# Patient Record
Sex: FEMALE | Race: WHITE | NOT HISPANIC OR LATINO | Employment: OTHER | URBAN - METROPOLITAN AREA
[De-identification: names, ages, dates, MRNs, and addresses within clinical notes are randomized per-mention and may not be internally consistent; named-entity substitution may affect disease eponyms.]

---

## 2018-12-06 ENCOUNTER — HOSPITAL ENCOUNTER (EMERGENCY)
Facility: HOSPITAL | Age: 78
Discharge: HOME | End: 2018-12-06
Attending: EMERGENCY MEDICINE | Admitting: EMERGENCY MEDICINE

## 2018-12-06 ENCOUNTER — APPOINTMENT (OUTPATIENT)
Dept: CT IMAGING | Facility: HOSPITAL | Age: 78
End: 2018-12-06

## 2018-12-06 VITALS
WEIGHT: 130 LBS | HEART RATE: 105 BPM | DIASTOLIC BLOOD PRESSURE: 88 MMHG | TEMPERATURE: 98.1 F | HEIGHT: 65 IN | SYSTOLIC BLOOD PRESSURE: 121 MMHG | OXYGEN SATURATION: 96 % | BODY MASS INDEX: 21.66 KG/M2 | RESPIRATION RATE: 24 BRPM

## 2018-12-06 DIAGNOSIS — J98.01 BRONCHOSPASM: Primary | ICD-10-CM

## 2018-12-06 DIAGNOSIS — Z65.9 POOR SOCIAL SITUATION: ICD-10-CM

## 2018-12-06 DIAGNOSIS — A49.9 BACTERIAL UTI: ICD-10-CM

## 2018-12-06 DIAGNOSIS — N39.0 BACTERIAL UTI: ICD-10-CM

## 2018-12-06 LAB
ALBUMIN SERPL-MCNC: 3.6 G/DL (ref 3.2–4.8)
ALBUMIN/GLOB SERPL: 1.4 G/DL (ref 1.5–2.5)
ALP SERPL-CCNC: 77 U/L (ref 25–100)
ALT SERPL W P-5'-P-CCNC: 27 U/L (ref 7–40)
ANION GAP SERPL CALCULATED.3IONS-SCNC: 4 MMOL/L (ref 3–11)
AST SERPL-CCNC: 32 U/L (ref 0–33)
BACTERIA UR QL AUTO: ABNORMAL /HPF
BASOPHILS # BLD AUTO: 0.05 10*3/MM3 (ref 0–0.2)
BASOPHILS NFR BLD AUTO: 0.5 % (ref 0–1)
BILIRUB SERPL-MCNC: 0.9 MG/DL (ref 0.3–1.2)
BILIRUB UR QL STRIP: NEGATIVE
BUN BLD-MCNC: 12 MG/DL (ref 9–23)
BUN/CREAT SERPL: 26.1 (ref 7–25)
CALCIUM SPEC-SCNC: 9 MG/DL (ref 8.7–10.4)
CHLORIDE SERPL-SCNC: 106 MMOL/L (ref 99–109)
CLARITY UR: ABNORMAL
CO2 SERPL-SCNC: 30 MMOL/L (ref 20–31)
COLOR UR: ABNORMAL
CREAT BLD-MCNC: 0.46 MG/DL (ref 0.6–1.3)
DEPRECATED RDW RBC AUTO: 56 FL (ref 37–54)
EOSINOPHIL # BLD AUTO: 0.19 10*3/MM3 (ref 0–0.3)
EOSINOPHIL NFR BLD AUTO: 1.9 % (ref 0–3)
ERYTHROCYTE [DISTWIDTH] IN BLOOD BY AUTOMATED COUNT: 16.5 % (ref 11.3–14.5)
GFR SERPL CREATININE-BSD FRML MDRD: 132 ML/MIN/1.73
GLOBULIN UR ELPH-MCNC: 2.6 GM/DL
GLUCOSE BLD-MCNC: 102 MG/DL (ref 70–100)
GLUCOSE UR STRIP-MCNC: NEGATIVE MG/DL
HCT VFR BLD AUTO: 42.7 % (ref 34.5–44)
HGB BLD-MCNC: 13.3 G/DL (ref 11.5–15.5)
HGB UR QL STRIP.AUTO: ABNORMAL
HYALINE CASTS UR QL AUTO: ABNORMAL /LPF
IMM GRANULOCYTES # BLD: 0.02 10*3/MM3 (ref 0–0.03)
IMM GRANULOCYTES NFR BLD: 0.2 % (ref 0–0.6)
KETONES UR QL STRIP: NEGATIVE
LEUKOCYTE ESTERASE UR QL STRIP.AUTO: ABNORMAL
LYMPHOCYTES # BLD AUTO: 2.07 10*3/MM3 (ref 0.6–4.8)
LYMPHOCYTES NFR BLD AUTO: 20.3 % (ref 24–44)
MCH RBC QN AUTO: 28.8 PG (ref 27–31)
MCHC RBC AUTO-ENTMCNC: 31.1 G/DL (ref 32–36)
MCV RBC AUTO: 92.4 FL (ref 80–99)
MONOCYTES # BLD AUTO: 0.81 10*3/MM3 (ref 0–1)
MONOCYTES NFR BLD AUTO: 7.9 % (ref 0–12)
NEUTROPHILS # BLD AUTO: 7.1 10*3/MM3 (ref 1.5–8.3)
NEUTROPHILS NFR BLD AUTO: 69.4 % (ref 41–71)
NITRITE UR QL STRIP: POSITIVE
PH UR STRIP.AUTO: <=5 [PH] (ref 5–8)
PLATELET # BLD AUTO: 337 10*3/MM3 (ref 150–450)
PMV BLD AUTO: 9.3 FL (ref 6–12)
POTASSIUM BLD-SCNC: 3.5 MMOL/L (ref 3.5–5.5)
PROT SERPL-MCNC: 6.2 G/DL (ref 5.7–8.2)
PROT UR QL STRIP: NEGATIVE
RBC # BLD AUTO: 4.62 10*6/MM3 (ref 3.89–5.14)
RBC # UR: ABNORMAL /HPF
REF LAB TEST METHOD: ABNORMAL
SODIUM BLD-SCNC: 140 MMOL/L (ref 132–146)
SP GR UR STRIP: 1.02 (ref 1–1.03)
SQUAMOUS #/AREA URNS HPF: ABNORMAL /HPF
TROPONIN I SERPL-MCNC: <0.006 NG/ML
TSH SERPL DL<=0.05 MIU/L-ACNC: 1.24 MIU/ML (ref 0.35–5.35)
UROBILINOGEN UR QL STRIP: ABNORMAL
WBC NRBC COR # BLD: 10.22 10*3/MM3 (ref 3.5–10.8)
WBC UR QL AUTO: ABNORMAL /HPF

## 2018-12-06 PROCEDURE — 85025 COMPLETE CBC W/AUTO DIFF WBC: CPT | Performed by: EMERGENCY MEDICINE

## 2018-12-06 PROCEDURE — 87077 CULTURE AEROBIC IDENTIFY: CPT | Performed by: EMERGENCY MEDICINE

## 2018-12-06 PROCEDURE — 87186 SC STD MICRODIL/AGAR DIL: CPT | Performed by: EMERGENCY MEDICINE

## 2018-12-06 PROCEDURE — 93005 ELECTROCARDIOGRAM TRACING: CPT | Performed by: EMERGENCY MEDICINE

## 2018-12-06 PROCEDURE — 74176 CT ABD & PELVIS W/O CONTRAST: CPT

## 2018-12-06 PROCEDURE — P9612 CATHETERIZE FOR URINE SPEC: HCPCS

## 2018-12-06 PROCEDURE — 99284 EMERGENCY DEPT VISIT MOD MDM: CPT

## 2018-12-06 PROCEDURE — 81001 URINALYSIS AUTO W/SCOPE: CPT | Performed by: EMERGENCY MEDICINE

## 2018-12-06 PROCEDURE — 94640 AIRWAY INHALATION TREATMENT: CPT

## 2018-12-06 PROCEDURE — 84443 ASSAY THYROID STIM HORMONE: CPT | Performed by: EMERGENCY MEDICINE

## 2018-12-06 PROCEDURE — 80053 COMPREHEN METABOLIC PANEL: CPT | Performed by: EMERGENCY MEDICINE

## 2018-12-06 PROCEDURE — 84484 ASSAY OF TROPONIN QUANT: CPT | Performed by: EMERGENCY MEDICINE

## 2018-12-06 PROCEDURE — 87086 URINE CULTURE/COLONY COUNT: CPT | Performed by: EMERGENCY MEDICINE

## 2018-12-06 RX ORDER — ALBUTEROL SULFATE 2.5 MG/3ML
2.5 SOLUTION RESPIRATORY (INHALATION) ONCE
Status: COMPLETED | OUTPATIENT
Start: 2018-12-06 | End: 2018-12-06

## 2018-12-06 RX ORDER — LISINOPRIL 10 MG/1
10 TABLET ORAL DAILY
COMMUNITY

## 2018-12-06 RX ORDER — CEFUROXIME AXETIL 500 MG/1
500 TABLET ORAL 2 TIMES DAILY
Qty: 20 TABLET | Refills: 0 | Status: SHIPPED | OUTPATIENT
Start: 2018-12-06

## 2018-12-06 RX ORDER — ASPIRIN 81 MG/1
81 TABLET ORAL DAILY
COMMUNITY

## 2018-12-06 RX ORDER — LEVOTHYROXINE SODIUM 112 UG/1
112 TABLET ORAL DAILY
COMMUNITY

## 2018-12-06 RX ADMIN — ALBUTEROL SULFATE 2.5 MG: 2.5 SOLUTION RESPIRATORY (INHALATION) at 11:27

## 2018-12-06 NOTE — ED PROVIDER NOTES
Subjective   Yolanda Griffin is a 78 y.o.female who presents to the ED with complaints of abdominal distension. The patient was evaluated by her PCP for her yearly check-up before she was sent here for further evaluation because her PCP noticed her abdomen was distended. Her guardian is unsure if she has been compliant with her medications. The patient denies any abdominal pain, chest pain, or shortness of breath. Additionally, she has a history of dementia, an appendectomy, cholecystectomy, and hypothyroidism. Moreover, the patinet has an ostomy bag implanted. There are no other complaints at this time.         History provided by:  Patient (and guardian)  History limited by:  Dementia  Abdominal Pain   Pain location:  Generalized  Pain quality comment:  Distension  Pain radiates to:  Does not radiate  Pain severity:  Mild  Onset quality:  Sudden  Duration:  1 day  Timing:  Constant  Progression:  Unchanged  Chronicity:  New  Relieved by:  None tried  Worsened by:  Nothing  Ineffective treatments:  None tried  Associated symptoms: no chest pain and no shortness of breath    Risk factors: being elderly and multiple surgeries        Review of Systems   Unable to perform ROS: Dementia   Respiratory: Negative for shortness of breath.    Cardiovascular: Negative for chest pain.   Gastrointestinal: Positive for abdominal distention. Negative for abdominal pain.       Past Medical History:   Diagnosis Date   • Dementia        No Known Allergies    History reviewed. No pertinent surgical history.    History reviewed. No pertinent family history.    Social History     Socioeconomic History   • Marital status:      Spouse name: Not on file   • Number of children: Not on file   • Years of education: Not on file   • Highest education level: Not on file   Tobacco Use   • Smoking status: Former Smoker   Substance and Sexual Activity   • Alcohol use: No     Frequency: Never   • Drug use: No   • Sexual activity: Defer          Objective   Physical Exam   Constitutional: She appears well-developed and well-nourished. No distress.   HENT:   Head: Normocephalic and atraumatic.   Nose: Nose normal.   Eyes: Conjunctivae are normal. No scleral icterus.   Neck: Normal range of motion. Neck supple.   Cardiovascular: Normal rate, regular rhythm, normal heart sounds and intact distal pulses.   No murmur heard.  Pulmonary/Chest: Effort normal. No respiratory distress. She has wheezes.   Expiratory wheezing.    Abdominal: Soft. She exhibits no distension. There is no tenderness.   Pt does not have an ostomy bag in place. Decreased bowel sounds. Abdomen is not distended.    Musculoskeletal: Normal range of motion. She exhibits no edema.   Neurological: She is alert.   Skin: Skin is warm and dry.   Psychiatric: She has a normal mood and affect. Her behavior is normal.   Nursing note and vitals reviewed.      Procedures         ED Course  ED Course as of Dec 06 2048   Thu Dec 06, 2018   1256 Reviewed her CT scan as well as the report.  I reviewed her old records.  Dr. Ross did her initial surgery in 2012.  I have paged him to discuss today's CT findings  [DT]   5419 I had discussion with  who remembers Mrs. Griffin and is familiar with her.  He advises that what we are seeing on CAT scan is mucus and possibly dried mucus.  He said it is not crucial to evacuate her colon although may be worth trying.  Her laboratory exam is unremarkable.  She is apparently taking her thyroid hormone.  Laboratory exam does not show significant dehydration she has pneumo-bili of which in her case I feel is benign as she has no tenderness and has normal liver tests.  She has had prior cholecystectomy.  [DT]   3779 Attempted rectal exam and Mrs. Hairston anus is very small and would not permit a finger, she became noncooperative.  Her belly remains soft and nontender and she tells me she is very hungry and wants lunch which I have taken her.  I have  consulted our .  I spoke with the state guardian before she left and she advises me that they have tried getting home health and other help in the home and Mrs. Griffin and her family would not allow them into her house.  I'm not finding acute medical indication for admission.  I will discuss with our  to see what they can offer  [DT]   1411 I discussed with her  who is going to call Mrs. Hairston state guardian.  At this point Mrs. Griffin is sitting up eating lunch in no distress.  I'm not finding an acute medical problem that would warrant admission.  She does have UTI  [DT]   1541 Our  has spoken with the state guardian and there is indeed an active APS file open on her.  We will work on filling her prescription for antibiotics so that we can hand it to her upon discharge.  We are contacting family to see if they will pick her up if they're unable we will: Ambulance  [DT]   6848 I spoke with  about findings today.  I spoke with Mrs. Griffin's son who is now present.  He tells me that he does indeed place ostomy appliances on her abdomen but that she frequently pulls them off.  He tells me he has some at home.  He tells me he can go to [facility] tomorrow and get more and the issue is not that they don't have the supplies but that it is that she pulls them off due to her dementia  [DT]      ED Course User Index  [DT] Dio Hook MD     Recent Results (from the past 24 hour(s))   CBC Auto Differential    Collection Time: 12/06/18 11:15 AM   Result Value Ref Range    WBC 10.22 3.50 - 10.80 10*3/mm3    RBC 4.62 3.89 - 5.14 10*6/mm3    Hemoglobin 13.3 11.5 - 15.5 g/dL    Hematocrit 42.7 34.5 - 44.0 %    MCV 92.4 80.0 - 99.0 fL    MCH 28.8 27.0 - 31.0 pg    MCHC 31.1 (L) 32.0 - 36.0 g/dL    RDW 16.5 (H) 11.3 - 14.5 %    RDW-SD 56.0 (H) 37.0 - 54.0 fl    MPV 9.3 6.0 - 12.0 fL    Platelets 337 150 - 450 10*3/mm3    Neutrophil % 69.4 41.0 - 71.0 %     Lymphocyte % 20.3 (L) 24.0 - 44.0 %    Monocyte % 7.9 0.0 - 12.0 %    Eosinophil % 1.9 0.0 - 3.0 %    Basophil % 0.5 0.0 - 1.0 %    Immature Grans % 0.2 0.0 - 0.6 %    Neutrophils, Absolute 7.10 1.50 - 8.30 10*3/mm3    Lymphocytes, Absolute 2.07 0.60 - 4.80 10*3/mm3    Monocytes, Absolute 0.81 0.00 - 1.00 10*3/mm3    Eosinophils, Absolute 0.19 0.00 - 0.30 10*3/mm3    Basophils, Absolute 0.05 0.00 - 0.20 10*3/mm3    Immature Grans, Absolute 0.02 0.00 - 0.03 10*3/mm3   Urinalysis With Microscopic If Indicated (No Culture) - Urine, Catheter    Collection Time: 12/06/18 11:29 AM   Result Value Ref Range    Color, UA Dark Yellow (A) Yellow, Straw    Appearance, UA Cloudy (A) Clear    pH, UA <=5.0 5.0 - 8.0    Specific Gravity, UA 1.019 1.001 - 1.030    Glucose, UA Negative Negative    Ketones, UA Negative Negative    Bilirubin, UA Negative Negative    Blood, UA Moderate (2+) (A) Negative    Protein, UA Negative Negative    Leuk Esterase, UA Moderate (2+) (A) Negative    Nitrite, UA Positive (A) Negative    Urobilinogen, UA 0.2 E.U./dL 0.2 - 1.0 E.U./dL   Urinalysis, Microscopic Only - Urine, Catheter    Collection Time: 12/06/18 11:29 AM   Result Value Ref Range    RBC, UA 7-12 (A) None Seen, 0-2 /HPF    WBC, UA 31-50 (A) None Seen, 0-2 /HPF    Bacteria, UA 4+ (A) None Seen, Trace /HPF    Squamous Epithelial Cells, UA None Seen None Seen, 0-2 /HPF    Hyaline Casts, UA 0-6 0 - 6 /LPF    Methodology Automated Microscopy    Comprehensive Metabolic Panel    Collection Time: 12/06/18 12:11 PM   Result Value Ref Range    Glucose 102 (H) 70 - 100 mg/dL    BUN 12 9 - 23 mg/dL    Creatinine 0.46 (L) 0.60 - 1.30 mg/dL    Sodium 140 132 - 146 mmol/L    Potassium 3.5 3.5 - 5.5 mmol/L    Chloride 106 99 - 109 mmol/L    CO2 30.0 20.0 - 31.0 mmol/L    Calcium 9.0 8.7 - 10.4 mg/dL    Total Protein 6.2 5.7 - 8.2 g/dL    Albumin 3.60 3.20 - 4.80 g/dL    ALT (SGPT) 27 7 - 40 U/L    AST (SGOT) 32 0 - 33 U/L    Alkaline Phosphatase 77 25 -  100 U/L    Total Bilirubin 0.9 0.3 - 1.2 mg/dL    eGFR Non African Amer 132 >60 mL/min/1.73    Globulin 2.6 gm/dL    A/G Ratio 1.4 (L) 1.5 - 2.5 g/dL    BUN/Creatinine Ratio 26.1 (H) 7.0 - 25.0    Anion Gap 4.0 3.0 - 11.0 mmol/L   TSH    Collection Time: 12/06/18 12:11 PM   Result Value Ref Range    TSH 1.237 0.350 - 5.350 mIU/mL   Troponin    Collection Time: 12/06/18 12:11 PM   Result Value Ref Range    Troponin I <0.006 <=0.039 ng/mL     Note: In addition to lab results from this visit, the labs listed above may include labs taken at another facility or during a different encounter within the last 24 hours. Please correlate lab times with ED admission and discharge times for further clarification of the services performed during this visit.    CT Abdomen Pelvis Without Contrast   Final Result   1. No evidence for small bowel obstruction with large rectal stool   burden. Despite distention there is questionable mural thickening of the   rectal vault concerning for evolving stercoral colitis. No free fluid or   loculated fluid collection within the abdomen. No evidence for   perforation.   2. Right lower quadrant ostomy with peristomal hernia.   3. Pneumobilia may be correlated with recent instrumentation such as   EGD.   4. Cholelithiasis.   5. Large hiatal hernia with nearly entirely intrathoracic stomach.       D:  12/06/2018   E:  12/06/2018       This report was finalized on 12/6/2018 1:30 PM by Dr. Abner Sharp.            Vitals:    12/06/18 1500 12/06/18 1501 12/06/18 1528 12/06/18 1530   BP: 131/99   121/88   Patient Position:       Pulse:  105     Resp:       Temp:       TempSrc:       SpO2:  97% 96%    Weight:       Height:         Medications   albuterol (PROVENTIL) nebulizer solution 0.083% 2.5 mg/3mL (2.5 mg Nebulization Given 12/6/18 1127)     ECG/EMG Results (last 24 hours)     ** No results found for the last 24 hours. **                        MDM  Number of Diagnoses or Management  Options  Bacterial UTI: new and requires workup  Bronchospasm: new and requires workup  Poor social situation:      Amount and/or Complexity of Data Reviewed  Clinical lab tests: ordered and reviewed  Tests in the radiology section of CPT®: ordered and reviewed  Obtain history from someone other than the patient: yes  Review and summarize past medical records: yes  Discuss the patient with other providers: yes  Independent visualization of images, tracings, or specimens: yes    Patient Progress  Patient progress: improved      Final diagnoses:   Bronchospasm   Poor social situation   Bacterial UTI       Documentation assistance provided by aurelia Marte.  Information recorded by the aurelia was done at my direction and has been verified and validated by me.     Ricardo Marte  12/06/18 1048       Ricardo Marte  12/06/18 1541       Dio Hook MD  12/06/18 5901

## 2018-12-06 NOTE — PROGRESS NOTES
Continued Stay Note  [facility]     Patient Name: Yolanda Griffin  MRN: [id number]  Today's Date: 12/6/2018    Admit Date: 12/6/2018    Discharge Plan     Row Name 12/06/18 1431       Plan    Plan Comments  CM consulted in regards to pt having state rudi and pt's family refusing home health and other services for pt. Possible need for long term placement however per Dr. Hook there is no reason for hospital admission at this time. YAEL spoke with Dyan BECERRA and updated on pt admission and Dyan will notify rudi pt will be discharged home.         Discharge Codes    No documentation.             Carmella Martinez

## 2018-12-06 NOTE — PROGRESS NOTES
"Continued Stay Note  [facility]     Patient Name: Yolanda Griffin  MRN: [id number]  Today's Date: 12/6/2018    Admit Date: 12/6/2018    Discharge Plan     Row Name 12/06/18 1524       Plan    Plan  MARAIM follow-up    Patient/Family in Agreement with Plan  yes    Plan Comments  SW was contacted by by ED CM regarding pt having a state guardian.  Pt is not being admitted and is ready for discharge.  Pt's state guardian is Geovanna Benjamin.  Ms. Benjamin's contact numbers are [phone] (office) and [phone] (cell).  Ms. Benjamin stated she was hopeful the hospital would admit pt and keep for long-term placement.  MARIAM advised that pt did not meet criteria for admission.  MARIAM contacted the CM Director to discuss the situation and the decision to not admit remained.  Ms. Benjamin reported concerns about pt's spouse and son not providing proper care for pt. and Ms. Benjamin would begin seeking placement for pt.  Ms. Benjamin stated adult protective services has reported that pt's spouse and son have \"mental health issues\" and don't allow home health providers to enter the home.  Ms. Benjamin stated she would call the son for him to  pt from the ER. She stated her office does not provide transportation. She verbalized plans to advise the son that she would be follow-up on pt following her discharge from the ER.      Row Name 12/06/18 0045       Plan    Plan Comments  CM consulted in regards to pt having state gaurdian and pt's family refusing home health and other services for pt. Possible need for long term placement however per Dr. Hook there is no reason for hospital admission at this time. YAEL spoke with Dyan BECERRA and updated on pt admission and Dyan will notify gaurdian pt will be discharged home.         Discharge Codes    No documentation.             NIKO Tate    "

## 2018-12-08 LAB
BACTERIA SPEC AEROBE CULT: ABNORMAL
BACTERIA SPEC AEROBE CULT: ABNORMAL

## 2019-11-30 ENCOUNTER — OUTSIDE FACILITY SERVICE (OUTPATIENT)
Dept: PULMONOLOGY | Facility: CLINIC | Age: 79
End: 2019-11-30

## 2019-11-30 PROCEDURE — 99221 1ST HOSP IP/OBS SF/LOW 40: CPT | Performed by: NURSE PRACTITIONER

## 2019-12-01 ENCOUNTER — OUTSIDE FACILITY SERVICE (OUTPATIENT)
Dept: PULMONOLOGY | Facility: CLINIC | Age: 79
End: 2019-12-01

## 2019-12-01 PROCEDURE — 99233 SBSQ HOSP IP/OBS HIGH 50: CPT | Performed by: NURSE PRACTITIONER

## 2019-12-02 ENCOUNTER — OUTSIDE FACILITY SERVICE (OUTPATIENT)
Dept: PULMONOLOGY | Facility: CLINIC | Age: 79
End: 2019-12-02

## 2019-12-02 PROCEDURE — 99233 SBSQ HOSP IP/OBS HIGH 50: CPT | Performed by: INTERNAL MEDICINE

## 2019-12-03 ENCOUNTER — OUTSIDE FACILITY SERVICE (OUTPATIENT)
Dept: PULMONOLOGY | Facility: CLINIC | Age: 79
End: 2019-12-03

## 2019-12-12 ENCOUNTER — OUTSIDE FACILITY SERVICE (OUTPATIENT)
Dept: PULMONOLOGY | Facility: CLINIC | Age: 79
End: 2019-12-12

## 2019-12-12 PROCEDURE — 99233 SBSQ HOSP IP/OBS HIGH 50: CPT | Performed by: INTERNAL MEDICINE

## 2019-12-14 ENCOUNTER — OUTSIDE FACILITY SERVICE (OUTPATIENT)
Dept: PULMONOLOGY | Facility: CLINIC | Age: 79
End: 2019-12-14

## 2019-12-14 PROCEDURE — 99233 SBSQ HOSP IP/OBS HIGH 50: CPT | Performed by: NURSE PRACTITIONER

## 2019-12-15 ENCOUNTER — OUTSIDE FACILITY SERVICE (OUTPATIENT)
Dept: PULMONOLOGY | Facility: CLINIC | Age: 79
End: 2019-12-15

## 2019-12-15 PROCEDURE — 99233 SBSQ HOSP IP/OBS HIGH 50: CPT | Performed by: NURSE PRACTITIONER

## 2019-12-16 ENCOUNTER — OUTSIDE FACILITY SERVICE (OUTPATIENT)
Dept: PULMONOLOGY | Facility: CLINIC | Age: 79
End: 2019-12-16

## 2019-12-16 PROCEDURE — 99232 SBSQ HOSP IP/OBS MODERATE 35: CPT | Performed by: NURSE PRACTITIONER

## 2019-12-17 ENCOUNTER — OUTSIDE FACILITY SERVICE (OUTPATIENT)
Dept: PULMONOLOGY | Facility: CLINIC | Age: 79
End: 2019-12-17

## 2019-12-17 PROCEDURE — 99233 SBSQ HOSP IP/OBS HIGH 50: CPT | Performed by: INTERNAL MEDICINE

## 2019-12-18 ENCOUNTER — OUTSIDE FACILITY SERVICE (OUTPATIENT)
Dept: PULMONOLOGY | Facility: CLINIC | Age: 79
End: 2019-12-18

## 2019-12-18 PROCEDURE — 99233 SBSQ HOSP IP/OBS HIGH 50: CPT | Performed by: NURSE PRACTITIONER

## 2019-12-19 ENCOUNTER — OUTSIDE FACILITY SERVICE (OUTPATIENT)
Dept: PULMONOLOGY | Facility: CLINIC | Age: 79
End: 2019-12-19

## 2019-12-19 PROCEDURE — 99232 SBSQ HOSP IP/OBS MODERATE 35: CPT | Performed by: NURSE PRACTITIONER

## 2019-12-20 ENCOUNTER — OUTSIDE FACILITY SERVICE (OUTPATIENT)
Dept: PULMONOLOGY | Facility: CLINIC | Age: 79
End: 2019-12-20

## 2019-12-20 PROCEDURE — 99232 SBSQ HOSP IP/OBS MODERATE 35: CPT | Performed by: NURSE PRACTITIONER

## 2019-12-23 ENCOUNTER — OUTSIDE FACILITY SERVICE (OUTPATIENT)
Dept: PULMONOLOGY | Facility: CLINIC | Age: 79
End: 2019-12-23

## 2019-12-23 PROCEDURE — 99233 SBSQ HOSP IP/OBS HIGH 50: CPT | Performed by: NURSE PRACTITIONER
